# Patient Record
Sex: FEMALE | Race: BLACK OR AFRICAN AMERICAN
[De-identification: names, ages, dates, MRNs, and addresses within clinical notes are randomized per-mention and may not be internally consistent; named-entity substitution may affect disease eponyms.]

---

## 2020-05-12 ENCOUNTER — HOSPITAL ENCOUNTER (EMERGENCY)
Dept: HOSPITAL 72 - EMR | Age: 60
Discharge: HOME | End: 2020-05-12
Payer: SELF-PAY

## 2020-05-12 VITALS — WEIGHT: 150 LBS | HEIGHT: 67 IN | BODY MASS INDEX: 23.54 KG/M2

## 2020-05-12 VITALS — DIASTOLIC BLOOD PRESSURE: 86 MMHG | SYSTOLIC BLOOD PRESSURE: 125 MMHG

## 2020-05-12 VITALS — DIASTOLIC BLOOD PRESSURE: 76 MMHG | SYSTOLIC BLOOD PRESSURE: 134 MMHG

## 2020-05-12 DIAGNOSIS — I10: ICD-10-CM

## 2020-05-12 DIAGNOSIS — I25.2: ICD-10-CM

## 2020-05-12 DIAGNOSIS — M62.838: ICD-10-CM

## 2020-05-12 DIAGNOSIS — M54.10: Primary | ICD-10-CM

## 2020-05-12 DIAGNOSIS — Z88.5: ICD-10-CM

## 2020-05-12 PROCEDURE — 73030 X-RAY EXAM OF SHOULDER: CPT

## 2020-05-12 PROCEDURE — 96372 THER/PROPH/DIAG INJ SC/IM: CPT

## 2020-05-12 PROCEDURE — 99283 EMERGENCY DEPT VISIT LOW MDM: CPT

## 2020-05-12 NOTE — EMERGENCY ROOM REPORT
History of Present Illness


General


Chief Complaint:  Pain


Source:  Patient





Present Illness


HPI


61 YO Female presents to the ED c/o 9/10 in severity Right shoulder and arm 

pain. Pt. reports waking up with middle finger "stuck/tight" in the flexed 

position which resolved with massage. Pt. reports she felt several clicking 

sensations in the middle finger as well. Pt. reports finger symptoms x 2 days. 

She states Shoulder, and upper arm pain since this am. She reports some 

increased strenuous activity around the house the last few days. She denies 

trauma or fall. She denies bruising, paresthesias or loss of gross motor 

movements. Pt. reports pain radiates up the arm to the shoulder when she 

squeezes anywhere on the right UE including forearm & wrist.  She denies 

erythema or warmth. She reports Pmhx of cardiac MI x 5 and reports being on 

Plavix as well as HTN medications, atorvastatin and daily multi-vitamin. Pt. 

reports she took a friends Flexeril this am with no relief of her symptoms. She 

denies neck or back pain. She reports pain exacerbated with attempts to brush 

her teeth or her hair. She is right hand dominant. She denies smoking.  Denies 

CP, Palpitations, LOC, AMS, dizziness, Changes in Vision, Sensation/paresthesias

, loss of gross motor movement or abnormal weakness, or a sudden severe 

headache.


Allergies:  


Uncoded Allergies:  


     CODEIN (Allergy, Unknown, 5/12/20)





COVID-19 Screening


Contact w/high risk pt:  No


Recent Travel to affected area:  No


Experienced COVID-19 symptoms?:  No





Patient History


Past Medical History:  see triage record, HTN, MI


Past Surgical History:  other - MI surgery


Pertinent Family History:  none


Pregnant Now:  No


Reviewed Nursing Documentation:  PMH: Agreed; PSxH: Agreed





Nursing Documentation-PMH


Past Medical History:  No History, Except For


Hx Cardiac Problems:  Yes - heartattack 5 times since 2003.





Review of Systems


All Other Systems:  negative except mentioned in HPI





Physical Exam





Vital Signs








  Date Time  Temp Pulse Resp B/P (MAP) Pulse Ox O2 Delivery O2 Flow Rate FiO2


 


5/12/20 12:57 97.7 79 17 134/76 (95) 96 Room Air  








Sp02 EP Interpretation:  reviewed, normal


General Appearance:  no apparent distress, alert, GCS 15, non-toxic


Head:  normocephalic, atraumatic


Eyes:  bilateral eye normal inspection, bilateral eye PERRL


ENT:  hearing grossly normal, normal voice


Neck:  full range of motion, no bony tend


Respiratory:  chest non-tender, lungs clear, normal breath sounds, no wheezing, 

speaking full sentences


Cardiovascular #1:  regular rate, rhythm, no edema, normal capillary refill


Cardiovascular #2:  2+ radial (R), 2+ radial (L)


Musculoskeletal:  back normal, normal range of motion, gait/station normal, 

tender - extreme tenderness to the lightest of palpation to exam to all muscles 

on the UE and the right trapezius.  Pain with ROM of Right shoulder, no clicking

, pain at the 75* point of elevation.  Pt. has most tenderness in the right 

deltoid.


Neurologic:  alert, motor strength/tone normal, oriented x3, sensory intact, 

responsive, speech normal, grossly normal, other - no motor weakness


Psychiatric:  judgement/insight normal


Skin:  no rash





Medical Decision Making


PA Attestation


Dr. Guzman is my supervising Physician whom patient management has been 

discussed with.


Diagnostic Impression:  


 Primary Impression:  


 Radicular pain in right arm


 Additional Impression:  


 Muscle spasm of right shoulder


ER Course


61 YO Female presents to the ED c/o 9/10 in severity Right shoulder and arm 

pain. Pt. reports waking up with middle finger "stuck/tight" in the flexed 

position which resolved with massage. Pt. reports she felt several clicking 

sensations in the middle finger as well. Pt. reports finger symptoms x 2 days. 

She states Shoulder, and upper arm pain since this am. She reports some 

increased strenuous activity around the house the last few days. She denies 

trauma or fall. She denies bruising, paresthesias or loss of gross motor 

movements. Pt. reports pain radiates up the arm to the shoulder when she 

squeezes anywhere on the right UE including forearm & wrist.  She denies 

erythema or warmth. She reports Pmhx of cardiac MI x 5 and reports being on 

Plavix as well as HTN medications, atorvastatin and daily multi-vitamin. Pt. 

reports she took a friends Flexeril this am with no relief of her symptoms. She 

denies neck or back pain. She reports pain exacerbated with attempts to brush 

her teeth or her hair. She is right hand dominant. She denies smoking.  Denies 

CP, Palpitations, LOC, AMS, dizziness, Changes in Vision, Sensation/paresthesias

, loss of gross motor movement or abnormal weakness, or a sudden severe 

headache.





Ddx considered but are not limited to Fracture, dislocation, contusion,  Sprain/

Strain/Spasm, upper extremity DVT, pinched nerve/neuropathy, radiculopathy, 

trigger finger, electrolyte abnormality just to name a few.





Vital signs: are WNL, pt. is afebrile





H&PE are most consistent with musculoskeletal injury will perform imaging to r/

o fractures/dislocations. Highly suspect muscle spasm and radiculopathy given 

extreme tenderness not proportional to exam to any muscle on the UE. Pt. 

reports pain not c/w symptoms of her previous MI's. 





ORDERS:





-  X-ray Right Shoulder 3 views    - negative for fx, Dislocation, or 

significant soft tissue injury, per preliminary read in ED, and signed by  BERLIN Burger, my supervising physician has reviewed, and agrees with my 

interpretation.





ED INTERVENTIONS:





-Robaxin PO


-Toradol IM


-Lidoderm TP.








DISCHARGE: At this time pt. is stable for d/c to home. Will provide printed 

patient care instructions, and any necessary prescriptions. Care plan and 

follow up instructions have been discussed with the patient prior to discharge.


Other X-Ray Diagnostic Results


Other X-Ray Diagnostic Results :  


   X-Ray ordered:  Right Shoulder


   # of Views/Limited Vs Complete:  3 View


   Indication:  Pain


   EP Interpretation:  Yes


   PA Xray:  Interpretation reviewed, by supervising MD, and agrees with 

findings.


   Interpretation:  no dislocation, no soft tissue swelling, no fractures


   Impression:  No acute disease


   Electronically Signed by:  Karen Burger PA-C





Last Vital Signs








  Date Time  Temp Pulse Resp B/P (MAP) Pulse Ox O2 Delivery O2 Flow Rate FiO2


 


5/12/20 13:17 97.7  17 134/76 96 Room Air  


 


5/12/20 12:57  79      








Disposition:  HOME, SELF-CARE


Condition:  Stable


Scripts


Methocarbamol* (ROBAXIN-750*) 750 Mg Tablet


750 MG PO QID for 7 Days, #28 TAB 0 Refills


   Prov: Karen Burger         5/12/20 


Acetaminophen* (TYLENOL EXTRA STRENGTH*) 500 Mg Tablet


500 MG ORAL Q6H, #20 TAB 0 Refills


   Prov: Karen Burger         5/12/20 


Lidocaine Patch* (Lidoderm Patch*) 1 Each Adh..patch


1 PATCH TOPIC DAILY, #30 PATCH 0 Refills


   Patch(es) may remain in place for up to 12 hours in any 24-hour


   period.


   Prov: Karen Burger         5/12/20


Referrals:  


[facility]





[facility]





[facility]


Patient Instructions:  Radicular Pain





Additional Instructions:  


Take medications as directed.  !!! Do not drink alcohol, drive, or operate 

heavy machinery while taking ROBAXIN ( MUSCLE RELAXER ) as this may cause 

drowsiness. 








 ** Follow up with a Primary Care Provider in 3-5 days, even if your symptoms 

have resolved. ** If symptoms persist Recommend NEUROLOGY EVALUATION REFERRAL, 

by your PCP **


--Please review list of primary care clinics, if you do not already have a 

primary care provider





Return sooner to ED if new symptoms occur, or current symptoms become worse. 











- Please note that this Emergency Department Report was dictated using [employer] technology software, occasionally this can lead to 

erroneous entry secondary to interpretation by the dictation equipment.











Karen Burger May 12, 2020 14:03

## 2020-05-15 ENCOUNTER — HOSPITAL ENCOUNTER (EMERGENCY)
Dept: HOSPITAL 72 - EMR | Age: 60
Discharge: HOME | End: 2020-05-15
Payer: SELF-PAY

## 2020-05-15 VITALS — SYSTOLIC BLOOD PRESSURE: 113 MMHG | DIASTOLIC BLOOD PRESSURE: 61 MMHG

## 2020-05-15 VITALS — HEIGHT: 67 IN | WEIGHT: 150 LBS | BODY MASS INDEX: 23.54 KG/M2

## 2020-05-15 VITALS — SYSTOLIC BLOOD PRESSURE: 110 MMHG | DIASTOLIC BLOOD PRESSURE: 65 MMHG

## 2020-05-15 DIAGNOSIS — I25.2: ICD-10-CM

## 2020-05-15 DIAGNOSIS — F17.200: ICD-10-CM

## 2020-05-15 DIAGNOSIS — Z88.6: ICD-10-CM

## 2020-05-15 DIAGNOSIS — M75.31: Primary | ICD-10-CM

## 2020-05-15 PROCEDURE — 99283 EMERGENCY DEPT VISIT LOW MDM: CPT

## 2020-05-15 NOTE — NUR
rmED Nurse Note:

Pt walked into ED for c/o R shoulder and arm. Pt is alert and orientedx4, 
ambulatory. Pt has previously been to [place] [facility] for same pain. She states 
prescriptions weren't helping and can't sleep. Pain is 10/10.

## 2020-05-15 NOTE — EMERGENCY ROOM REPORT
History of Present Illness


General


Chief Complaint:  Pain


Source:  Patient





Present Illness


HPI


This patient states she was seen here previously.  She states that 3 days ago 

she woke up with severe shoulder pain.  She was seen here and underwent an x-

ray that was unremarkable.  She states she still has a significant amount of 

pain.  She states she is unable to even move her right shoulder.  She denies 

trauma.  She denies fever chills.  She has nausea or vomiting.  She has no 

other complaints.


Allergies:  


Uncoded Allergies:  


     CODEIN (Allergy, Unknown, 5/12/20)





COVID-19 Screening


Contact w/high risk pt:  No


Recent Travel to affected area:  No


Experienced COVID-19 symptoms?:  No


COVID-19 Testing performed PTA:  No - 02/20/20


COVID-19 Screening:  Negative COVID-19


COVID-19 Testing Source:  [facility]





Patient History


Past Medical History:  see triage record, MI, CAD


Social History:  Reports: smoking; Denies: alcohol use, drug use


Pregnant Now:  No


Reviewed Nursing Documentation:  PMH: Agreed; PSxH: Agreed





Nursing Documentation-PMH


Hx Cardiac Problems:  Yes - heartattack 5 times since 2003.





Review of Systems


All Other Systems:  negative except mentioned in HPI





Physical Exam





Vital Signs








  Date Time  Temp Pulse Resp B/P (MAP) Pulse Ox O2 Delivery O2 Flow Rate FiO2


 


5/15/20 11:43 98.2 91 18 107/69 (82) 96 Room Air  








Sp02 EP Interpretation:  reviewed, normal


General Appearance:  no apparent distress, alert, GCS 15, non-toxic


Head:  normocephalic, atraumatic


Eyes:  bilateral eye normal inspection, bilateral eye PERRL


ENT:  hearing grossly normal, normal pharynx, no angioedema, normal voice


Neck:  full range of motion, supple/symm/no masses


Respiratory:  no respiratory distress, no retraction, no accessory muscle use, 

speaking full sentences


Rectal:  deferred


Musculoskeletal:  normal inspection, back normal, gait/station normal, tender - 

Pain and tenderness with any movement of the R. shoulder joint.


Neurologic:  alert, motor strength/tone normal, oriented x3, sensory intact, 

responsive, speech normal


Psychiatric:  judgement/insight normal, memory normal, mood/affect normal, no 

suicidal/homicidal ideation


Skin:  no rash, normal color





Medical Decision Making


Diagnostic Impression:  


 Primary Impression:  


 Calcific tendinitis


ER Course


This patient has findings on x-ray consistent with calcific tendinitis.  X-ray 

of the shoulder shows no fracture or dislocation.  The patient was instructed 

that she will need to follow-up closely with an orthopedic surgeon.  She may 

need steroid injections in the shoulder and further assessment to limit the 

possibility of frozen shoulder.  There is no emergency medical condition 

identified.  I will give the patient pain medications and anti-inflammatories, 

in addition to muscle relaxants.  The patient is given close return precautions 

and follow-up instructions.


Other X-Ray Diagnostic Results


Other X-Ray Diagnostic Results :  


   X-Ray ordered:  R. shoulder


   # of Views/Limited Vs Complete:  Complete


   Indication:  Pain


   EP Interpretation:  Yes


   Interpretation:  other - R. calcific tendinitis


   Impression:  No acute disease


   Electronically Signed by:  DO BERLIN Christian Scribe Text


See official report in the EMR





Last Vital Signs








  Date Time  Temp Pulse Resp B/P (MAP) Pulse Ox O2 Delivery O2 Flow Rate FiO2


 


5/15/20 13:37 98.2       


 


5/15/20 11:56  68 16 110/65 98 Room Air  








Status:  improved


Disposition:  HOME, SELF-CARE


Condition:  Improved


Referrals:  


NOT CHOSEN IPA/MD,REFERRING (PCP)











Afua Muhammad DO May 15, 2020 14:33

## 2020-05-15 NOTE — DIAGNOSTIC IMAGING REPORT
Indication: Shoulder pain

 

Technique: XRAY Shoulder Compl R

 

Comparison: 5/12/2020

 

Findings: Bone mineralization within normal limits. There is no acute fracture or

dislocation. Amorphous calcifications are suggested adjacent to the head of the

humerus suggesting calcific tendinitis. Imaged portions of the right likely. Coronary

arterial stents are incidentally noted.

 

Impression: Findings suggesting calcific tendinitis of the right shoulder.

 

No acute fracture or dislocation.